# Patient Record
Sex: FEMALE | Race: WHITE | NOT HISPANIC OR LATINO | Employment: FULL TIME | URBAN - METROPOLITAN AREA
[De-identification: names, ages, dates, MRNs, and addresses within clinical notes are randomized per-mention and may not be internally consistent; named-entity substitution may affect disease eponyms.]

---

## 2018-11-27 ENCOUNTER — TRANSCRIBE ORDERS (OUTPATIENT)
Dept: ADMINISTRATIVE | Facility: HOSPITAL | Age: 38
End: 2018-11-27

## 2018-11-27 DIAGNOSIS — Z12.31 ENCOUNTER FOR SCREENING MAMMOGRAM FOR MALIGNANT NEOPLASM OF BREAST: Primary | ICD-10-CM

## 2018-12-17 ENCOUNTER — OFFICE VISIT (OUTPATIENT)
Dept: FAMILY MEDICINE CLINIC | Facility: CLINIC | Age: 38
End: 2018-12-17
Payer: COMMERCIAL

## 2018-12-17 VITALS
HEIGHT: 67 IN | DIASTOLIC BLOOD PRESSURE: 78 MMHG | HEART RATE: 80 BPM | SYSTOLIC BLOOD PRESSURE: 130 MMHG | WEIGHT: 225 LBS | TEMPERATURE: 97.8 F | BODY MASS INDEX: 35.31 KG/M2

## 2018-12-17 DIAGNOSIS — B34.9 VIRAL INFECTION: Primary | ICD-10-CM

## 2018-12-17 PROCEDURE — 3008F BODY MASS INDEX DOCD: CPT | Performed by: FAMILY MEDICINE

## 2018-12-17 PROCEDURE — 99202 OFFICE O/P NEW SF 15 MIN: CPT | Performed by: FAMILY MEDICINE

## 2018-12-17 PROCEDURE — 1036F TOBACCO NON-USER: CPT | Performed by: FAMILY MEDICINE

## 2018-12-17 RX ORDER — NORETHINDRONE 0.35 MG/1
1 TABLET ORAL DAILY
Refills: 0 | COMMUNITY
Start: 2018-12-12

## 2018-12-17 NOTE — ASSESSMENT & PLAN NOTE
Patient's symptoms and findings are not really consistent with anyone thing in particular  There is some aspect of it that is like influenza, but other parts that her clearly not  Without having the shortness of breath and fevers, it is unlikely to be influenza  The fact that it improves throughout the day, then comes back later also goes against that  Recommend that we check laboratory studies including CBC, CMP, TS H  I did discuss with her about pregnancy, and she has been pregnant previously (has a 11year-old), but mentioned that this does not feel the same as pregnancy before  Re-evaluate in the future  If there is no change with anything, consider ultrasound, physical therapy

## 2018-12-17 NOTE — PROGRESS NOTES
Assessment/Plan:    Viral infection  Patient's symptoms and findings are not really consistent with anyone thing in particular  There is some aspect of it that is like influenza, but other parts that her clearly not  Without having the shortness of breath and fevers, it is unlikely to be influenza  The fact that it improves throughout the day, then comes back later also goes against that  Recommend that we check laboratory studies including CBC, CMP, TS H  I did discuss with her about pregnancy, and she has been pregnant previously (has a 11year-old), but mentioned that this does not feel the same as pregnancy before  Re-evaluate in the future  If there is no change with anything, consider ultrasound, physical therapy  Diagnoses and all orders for this visit:    Viral infection  -     CBC and differential; Future  -     Comprehensive metabolic panel; Future  -     TSH, 3rd generation; Future    Other orders  -     ALLISON 0 35 MG tablet; Take 1 tablet by mouth daily          Subjective: NEW patient  (2013) c/o intermittent dizziness, sweating, room spinning, diarrhea only in the am starting yesterday  No n/v  Symptoms resolved for the most part later in the day yesterday, then returned this morning again  Patient is taking OTC Multisymptom flu and Ibuprofen yesterday  No upper respiratory symptoms ie: Sore throat, cough,  Nasal congestion, etc  ak     Patient ID: Marvel Rondon is a 34 y o  female  Please see chief complaint  She start to have sweating, dizziness, chills  After that, some diarrhea  She did notice the sensation of motion  Denies any nausea or vomiting along with this  It was present 2 days ago, then noted again this morning  Denies any shortness of breath, chest tightness, wheezing  She denies any fevers, but felt warm in the morning  She did not check her temperatures at home, however              The following portions of the patient's history were reviewed and updated as appropriate: allergies, current medications, past family history, past medical history, past social history, past surgical history and problem list     Review of Systems   Constitutional: Positive for chills, diaphoresis and fatigue  Negative for activity change, appetite change and fever  HENT: Negative for congestion, dental problem, ear pain, hearing loss, nosebleeds, postnasal drip, rhinorrhea, sinus pain, sinus pressure, sneezing and sore throat  Respiratory: Negative for cough and shortness of breath  Cardiovascular: Negative  Gastrointestinal: Positive for diarrhea  Negative for abdominal distention, abdominal pain, blood in stool, constipation, nausea and vomiting  Genitourinary: Negative  All other systems reviewed and are negative  Objective:      /78   Pulse 80   Temp 97 8 °F (36 6 °C)   Ht 5' 7" (1 702 m)   Wt 102 kg (225 lb)   LMP 11/25/2018 (Exact Date)   Breastfeeding? No   BMI 35 24 kg/m²          Physical Exam   Constitutional: She is oriented to person, place, and time  She appears well-developed and well-nourished  HENT:   Head: Normocephalic and atraumatic  Right Ear: External ear normal    Left Ear: External ear normal    Nose: Nose normal    Mouth/Throat: Oropharynx is clear and moist and mucous membranes are normal  Abnormal dentition  Dental caries present  No oropharyngeal exudate  Eyes: Pupils are equal, round, and reactive to light  Conjunctivae are normal    Cardiovascular: Normal rate, regular rhythm and normal heart sounds  Pulses:       Carotid pulses are 2+ on the right side, and 2+ on the left side  Pulmonary/Chest: Effort normal and breath sounds normal  She has no wheezes  She has no rales  She exhibits no tenderness  Abdominal: Soft  Bowel sounds are normal  She exhibits no distension and no mass  There is no tenderness  There is no rebound and no guarding  Lymphadenopathy:     She has no cervical adenopathy     Neurological: She is alert and oriented to person, place, and time  She has normal reflexes  She displays normal reflexes  No cranial nerve deficit  She exhibits normal muscle tone  Coordination normal    Nursing note and vitals reviewed

## 2018-12-17 NOTE — PATIENT INSTRUCTIONS
Problem List Items Addressed This Visit        Other    Viral infection - Primary     Patient's symptoms and findings are not really consistent with anyone thing in particular  There is some aspect of it that is like influenza, but other parts that her clearly not  Without having the shortness of breath and fevers, it is unlikely to be influenza  The fact that it improves throughout the day, then comes back later also goes against that  Recommend that we check laboratory studies including CBC, CMP, TS H  I did discuss with her about pregnancy, and she has been pregnant previously (has a 11year-old), but mentioned that this does not feel the same as pregnancy before  Re-evaluate in the future  If there is no change with anything, consider ultrasound, physical therapy           Relevant Orders    CBC and differential    Comprehensive metabolic panel    TSH, 3rd generation

## 2019-01-11 ENCOUNTER — HOSPITAL ENCOUNTER (OUTPATIENT)
Dept: MAMMOGRAPHY | Facility: MEDICAL CENTER | Age: 39
Discharge: HOME | End: 2019-01-11
Payer: COMMERCIAL

## 2019-01-11 VITALS — WEIGHT: 225 LBS | HEIGHT: 67 IN | BODY MASS INDEX: 35.31 KG/M2

## 2019-01-11 DIAGNOSIS — Z12.31 ENCOUNTER FOR SCREENING MAMMOGRAM FOR MALIGNANT NEOPLASM OF BREAST: ICD-10-CM

## 2019-01-11 PROCEDURE — 77063 BREAST TOMOSYNTHESIS BI: CPT

## 2019-01-11 PROCEDURE — 77067 SCR MAMMO BI INCL CAD: CPT

## 2019-12-11 ENCOUNTER — TRANSCRIBE ORDERS (OUTPATIENT)
Dept: ADMINISTRATIVE | Facility: HOSPITAL | Age: 39
End: 2019-12-11

## 2019-12-11 DIAGNOSIS — Z12.31 VISIT FOR SCREENING MAMMOGRAM: Primary | ICD-10-CM

## 2020-02-11 ENCOUNTER — HOSPITAL ENCOUNTER (OUTPATIENT)
Dept: MAMMOGRAPHY | Facility: MEDICAL CENTER | Age: 40
Discharge: HOME | End: 2020-02-11
Payer: COMMERCIAL

## 2020-02-11 VITALS — HEIGHT: 67 IN | WEIGHT: 225 LBS | BODY MASS INDEX: 35.31 KG/M2

## 2020-02-11 DIAGNOSIS — Z12.31 VISIT FOR SCREENING MAMMOGRAM: ICD-10-CM

## 2020-02-11 PROCEDURE — 77063 BREAST TOMOSYNTHESIS BI: CPT

## 2020-02-11 PROCEDURE — 77067 SCR MAMMO BI INCL CAD: CPT

## 2021-01-21 ENCOUNTER — TRANSCRIBE ORDERS (OUTPATIENT)
Dept: ADMINISTRATIVE | Facility: HOSPITAL | Age: 41
End: 2021-01-21

## 2021-01-21 DIAGNOSIS — Z12.31 ENCOUNTER FOR SCREENING MAMMOGRAM FOR MALIGNANT NEOPLASM OF BREAST: Primary | ICD-10-CM

## 2021-04-16 ENCOUNTER — HOSPITAL ENCOUNTER (OUTPATIENT)
Dept: MAMMOGRAPHY | Facility: MEDICAL CENTER | Age: 41
Discharge: HOME | End: 2021-04-16
Payer: COMMERCIAL

## 2021-04-16 VITALS — BODY MASS INDEX: 35.31 KG/M2 | HEIGHT: 67 IN | WEIGHT: 225 LBS

## 2021-04-16 DIAGNOSIS — Z12.31 ENCOUNTER FOR SCREENING MAMMOGRAM FOR MALIGNANT NEOPLASM OF BREAST: ICD-10-CM

## 2021-04-16 PROCEDURE — 77067 SCR MAMMO BI INCL CAD: CPT

## 2021-04-16 PROCEDURE — 77063 BREAST TOMOSYNTHESIS BI: CPT

## 2022-04-19 ENCOUNTER — HOSPITAL ENCOUNTER (OUTPATIENT)
Dept: MAMMOGRAPHY | Facility: MEDICAL CENTER | Age: 42
Discharge: HOME | End: 2022-04-19
Payer: COMMERCIAL

## 2022-04-19 VITALS — WEIGHT: 225 LBS | BODY MASS INDEX: 35.31 KG/M2 | HEIGHT: 67 IN

## 2022-04-19 DIAGNOSIS — Z12.31 ENCOUNTER FOR SCREENING MAMMOGRAM FOR MALIGNANT NEOPLASM OF BREAST: ICD-10-CM

## 2022-04-19 PROCEDURE — 77063 BREAST TOMOSYNTHESIS BI: CPT

## 2022-04-19 PROCEDURE — 77067 SCR MAMMO BI INCL CAD: CPT

## 2023-05-04 ENCOUNTER — HOSPITAL ENCOUNTER (OUTPATIENT)
Dept: MAMMOGRAPHY | Facility: MEDICAL CENTER | Age: 43
Discharge: HOME | End: 2023-05-04

## 2023-05-04 VITALS — WEIGHT: 224.87 LBS | HEIGHT: 67 IN | BODY MASS INDEX: 35.29 KG/M2

## 2023-05-04 DIAGNOSIS — Z12.31 SCREENING MAMMOGRAM, ENCOUNTER FOR: ICD-10-CM

## 2023-06-05 ENCOUNTER — OFFICE VISIT (OUTPATIENT)
Dept: URGENT CARE | Facility: CLINIC | Age: 43
End: 2023-06-05
Payer: COMMERCIAL

## 2023-06-05 VITALS
RESPIRATION RATE: 18 BRPM | DIASTOLIC BLOOD PRESSURE: 102 MMHG | WEIGHT: 224 LBS | HEIGHT: 67 IN | HEART RATE: 110 BPM | TEMPERATURE: 97.3 F | BODY MASS INDEX: 35.16 KG/M2 | SYSTOLIC BLOOD PRESSURE: 185 MMHG | OXYGEN SATURATION: 97 %

## 2023-06-05 DIAGNOSIS — J02.9 SORE THROAT: Primary | ICD-10-CM

## 2023-06-05 LAB — S PYO AG THROAT QL: NEGATIVE

## 2023-06-05 PROCEDURE — S9083 URGENT CARE CENTER GLOBAL: HCPCS | Performed by: NURSE PRACTITIONER

## 2023-06-05 PROCEDURE — 87070 CULTURE OTHR SPECIMN AEROBIC: CPT | Performed by: NURSE PRACTITIONER

## 2023-06-05 PROCEDURE — 87880 STREP A ASSAY W/OPTIC: CPT | Performed by: NURSE PRACTITIONER

## 2023-06-05 PROCEDURE — G0382 LEV 3 HOSP TYPE B ED VISIT: HCPCS | Performed by: NURSE PRACTITIONER

## 2023-06-05 RX ORDER — LIDOCAINE HYDROCHLORIDE 20 MG/ML
SOLUTION OROPHARYNGEAL
Qty: 100 ML | Refills: 0 | Status: SHIPPED | OUTPATIENT
Start: 2023-06-05

## 2023-06-05 RX ORDER — LEVONORGESTREL 52 MG/1
INTRAUTERINE DEVICE INTRAUTERINE
COMMUNITY
Start: 2021-04-16

## 2023-06-05 NOTE — PROGRESS NOTES
"  [facility]        NAME: Edmundo Kaiser is a 37 y o  female  : 1980    MRN: [id number]  DATE: 2023  TIME: 2:38 PM    Assessment and Plan   Sore throat [J02 9]  1  Sore throat  POCT rapid strepA    Lidocaine Viscous HCl (XYLOCAINE) 2 % mucosal solution        Rapid strep negative   Will send throat culture   Start viscous lido  F/u with pcp   Pt in agreement with plan of care    Patient Instructions     Follow up with PCP in 3-5 days  Proceed to  ER if symptoms worsen  Chief Complaint     Chief Complaint   Patient presents with   • Sore Throat     Sore throat for a few days  Just got over GI bug  Was vomiting a lot         History of Present Illness   Edmundo Kaiser presents to the clinic c/o    Sore Throat (Sore throat for a few days  Just got over GI bug  Was vomiting a lot)  Pt states had the GI bug - no vomiting in the past 24 hours - able to keep liquids down  Initially thought was related to vomiting, however has not eased up yet  Tried chloroseptic once but due to her nausea didn't want to continue it  Review of Systems   Review of Systems   All other systems reviewed and are negative  Current Medications     Long-Term Medications   Medication Sig Dispense Refill   • ALLISON 0 35 MG tablet Take 1 tablet by mouth daily  0       Current Allergies     Allergies as of 2023   • (No Known Allergies)            The following portions of the patient's history were reviewed and updated as appropriate: allergies, current medications, past family history, past medical history, past social history, past surgical history and problem list     Objective   BP (!) 185/102   Pulse (!) 110   Temp (!) 97 3 °F (36 3 °C) (Tympanic)   Resp 18   Ht 5' 7\" (1 702 m)   Wt 102 kg (224 lb)   SpO2 97%   BMI 35 08 kg/m²        Physical Exam     Physical Exam  Vitals and nursing note reviewed  Constitutional:       Appearance: She is well-developed     HENT:      Head: Normocephalic " and atraumatic  Right Ear: Hearing, tympanic membrane, ear canal and external ear normal       Left Ear: Hearing, tympanic membrane, ear canal and external ear normal       Nose: Nose normal       Mouth/Throat:      Pharynx: Posterior oropharyngeal erythema present  Tonsils: Tonsillar exudate present  3+ on the right  3+ on the left  Eyes:      General: Lids are normal       Conjunctiva/sclera: Conjunctivae normal    Cardiovascular:      Rate and Rhythm: Normal rate and regular rhythm  Heart sounds: Normal heart sounds, S1 normal and S2 normal    Pulmonary:      Effort: Pulmonary effort is normal       Breath sounds: Normal breath sounds  Skin:     General: Skin is warm and dry  Neurological:      Mental Status: She is alert and oriented to person, place, and time  Psychiatric:         Speech: Speech normal          Behavior: Behavior normal  Behavior is cooperative  Thought Content:  Thought content normal          Judgment: Judgment normal

## 2023-06-05 NOTE — PATIENT INSTRUCTIONS
Pharyngitis   AMBULATORY CARE:   Pharyngitis , or sore throat, is inflammation of the tissues and structures in your pharynx (throat)  Pharyngitis is most often caused by bacteria or a virus  Other causes include smoking, allergies, or acid reflux  Signs and symptoms  depend on the cause of your pharyngitis  You may have any of the following:  Sore throat or pain when you swallow    Fever, chills, and body aches    Hoarse or raspy voice    Cough, runny or stuffy nose, itchy or watery eyes    Headache    Upset stomach and loss of appetite    Whitish-yellow patches on the back of the throat    Tender, swollen lumps on the sides of the neck    Call your local emergency number (911 in the [address] [facility]) if:   You have trouble breathing or swallowing because your throat is swollen  Seek care immediately if:   You are drooling because it hurts too much to swallow  Your fever is higher than 102? F (39?C) or lasts longer than 3 days  You are confused  You taste blood  Call your doctor if:   Your throat pain gets worse  You have a painful lump in your throat that does not go away after 5 days  Your symptoms do not improve after 5 days  You have questions or concerns about your condition or care  Treatment:  Viral pharyngitis will go away on its own without treatment  Your sore throat should start to feel better in 3 to 5 days  You may need any of the following:  Antibiotics  treat a bacterial infection  NSAIDs  help decrease swelling and pain or fever  This medicine is available with or without a doctor's order  NSAIDs can cause stomach bleeding or kidney problems in certain people  If you take blood thinner medicine, always ask your healthcare provider if NSAIDs are safe for you  Always read the medicine label and follow directions  Acetaminophen  decreases pain and fever  It is available without a doctor's order  Ask how much to take and how often to take it  Follow directions   Read the labels of all other medicines you are using to see if they also contain acetaminophen, or ask your doctor or pharmacist  Acetaminophen can cause liver damage if not taken correctly  Manage your symptoms:   Gargle salt water  Mix ¼ teaspoon salt in an 8 ounce glass of warm water and gargle  Do not swallow  Salt water may help decrease swelling in your throat  Drink liquids as directed  You may need to drink more liquids than usual  Liquids may help soothe your throat and prevent dehydration  Ask how much liquid to drink each day and which liquids are best for you  Use a cool mist humidifier  This will add moisture to the air and help decrease your cough  Soothe your throat  Cough drops, ice, soft foods, or popsicles may help decrease throat pain  Prevent the spread of pharyngitis:  Cover your mouth and nose when you cough or sneeze  Do not share food or drinks  Wash your hands often  Use soap and water  If soap and water are unavailable, use an alcohol-based hand   Follow up with your doctor as directed:  Write down your questions so you remember to ask them during your visits  © Copyright [facility] 2022 Information is for End User's use only and may not be sold, redistributed or otherwise used for commercial purposes  The above information is an  only  It is not intended as medical advice for individual conditions or treatments  Talk to your doctor, nurse or pharmacist before following any medical regimen to see if it is safe and effective for you

## 2023-06-06 ENCOUNTER — TELEPHONE (OUTPATIENT)
Dept: URGENT CARE | Facility: CLINIC | Age: 43
End: 2023-06-06

## 2023-06-06 NOTE — TELEPHONE ENCOUNTER
Patient called to state that the medication that was sent to the pharmacy is not in stock in any of the locations. She is asking if that she cannot fill it will the strept spread. Instructed patient that she was negative for strept in the office and the medication was just for the discomfort of her throat.   Made her aware that Hernandez Doty is not back until Thursday and that she can try OTC chloraseptic

## 2023-06-07 LAB — BACTERIA THROAT CULT: NORMAL
